# Patient Record
(demographics unavailable — no encounter records)

---

## 2024-10-31 NOTE — REASON FOR VISIT
[Post-Operative Visit] : a post-operative visit for [Ear Infections] : ear infections [Mother] : mother

## 2024-10-31 NOTE — PHYSICAL EXAM
[Placement/Patency] : tympanostomy tube in place and patent [Clear/Ventilated] : middle ear clear and well ventilated [1+] : 1+ [Normal Gait and Station] : normal gait and station [Normal muscle strength, symmetry and tone of facial, head and neck musculature] : normal muscle strength, symmetry and tone of facial, head and neck musculature [Normal] : no cervical lymphadenopathy [Exposed Vessel] : left anterior vessel not exposed [Increased Work of Breathing] : no increased work of breathing with use of accessory muscles and retractions

## 2024-10-31 NOTE — CONSULT LETTER
[Dear  ___] : Dear  [unfilled], [Courtesy Letter:] : I had the pleasure of seeing your patient, [unfilled], in my office today. [Please see my note below.] : Please see my note below. [Consult Closing:] : Thank you very much for allowing me to participate in the care of this patient.  If you have any questions, please do not hesitate to contact me. [Sincerely,] : Sincerely, [FreeTextEntry2] : Dr. Rober Knapp  [address] [place] [id number]  [phone] [FreeTextEntry3] : mE Mary MD Pediatric Otolaryngology / Head and Neck Surgery    [facility] [address] [place] [address] Tel [phone] Fax [phone]    [address] [facility], [facility] [address] [place] [address]  Tel [phone] Fax [phone]

## 2024-10-31 NOTE — HISTORY OF PRESENT ILLNESS
[de-identified] : Today I had the pleasure of seeing DONNA for post op evaluation.     Hx of bilateral preauricular skin tags, removed by Dr. Gunderson 8/29/23, history of normal kidney ultrasound  DONNA is now s/p Bilateral myringotomy and tubes 9/23/24 [de-identified] : No recent ear infections or otorrhea No concerns for hearing or speech  No chronic nasal congestion or snoring

## 2024-10-31 NOTE — ASSESSMENT
[FreeTextEntry1] : DONNA is a 16 month old girl presenting for eustachian tube dysfunction, recurrent otitis media with effusion  s/p bilateral myringotomy with tube placement 9/23/24  Eustachian Tube Dysfunction s/p BMT  - audiogram 10/31/24 within normal limits  - expectant management, monitor PET q6months until extrusion

## 2025-04-24 NOTE — ASSESSMENT
[FreeTextEntry1] : DONNA is a 22 month old girl presenting for eustachian tube dysfunction, recurrent otitis media with effusion  h/o bilateral preauricular skin tags, removed by Dr. Gunderson 8/29/23, h/o normal kidney ultrasound s/p bilateral myringotomy with tube placement 9/23/24  Eustachian Tube Dysfunction s/p BMT  - audiogram 10/31/24 within normal limits limited - expectant management, monitor PET q6months until extrusion

## 2025-04-24 NOTE — HISTORY OF PRESENT ILLNESS
[No Personal or Family History of Easy Bruising, Bleeding, or Issues with General Anesthesia] : No Personal or Family History of easy bruising, bleeding, or issues with general anesthesia [de-identified] : Today I had the pleasure of seeing DONNA FORMAN for follow up evaluation of ear infections History was obtained from patient, mother and chart.  Hx of bilateral preauricular skin tags, removed by Dr. Gunderson 8/29/23, history of normal kidney ultrasound  s/p bilateral myringotomy with tube placement 9/23/24 [de-identified] : doing well, speech improving, no infections